# Patient Record
Sex: FEMALE | Race: WHITE | NOT HISPANIC OR LATINO | Employment: FULL TIME | URBAN - METROPOLITAN AREA
[De-identification: names, ages, dates, MRNs, and addresses within clinical notes are randomized per-mention and may not be internally consistent; named-entity substitution may affect disease eponyms.]

---

## 2017-01-10 ENCOUNTER — OFFICE VISIT (OUTPATIENT)
Dept: OBSTETRICS AND GYNECOLOGY | Facility: CLINIC | Age: 37
End: 2017-01-10

## 2017-01-10 VITALS
BODY MASS INDEX: 33.31 KG/M2 | SYSTOLIC BLOOD PRESSURE: 112 MMHG | WEIGHT: 181 LBS | DIASTOLIC BLOOD PRESSURE: 78 MMHG | HEIGHT: 62 IN

## 2017-01-10 DIAGNOSIS — Z13.9 SCREENING: ICD-10-CM

## 2017-01-10 DIAGNOSIS — Z01.419 ENCOUNTER FOR GYNECOLOGICAL EXAMINATION WITHOUT ABNORMAL FINDING: Primary | ICD-10-CM

## 2017-01-10 DIAGNOSIS — Z12.4 SCREENING FOR MALIGNANT NEOPLASM OF CERVIX: ICD-10-CM

## 2017-01-10 LAB
BILIRUB BLD-MCNC: NEGATIVE MG/DL
CLARITY, POC: CLEAR
COLOR UR: YELLOW
GLUCOSE UR STRIP-MCNC: NEGATIVE MG/DL
KETONES UR QL: NEGATIVE
LEUKOCYTE EST, POC: NEGATIVE
NITRITE UR-MCNC: NEGATIVE MG/ML
PH UR: 5.5 [PH] (ref 5–8)
PROT UR STRIP-MCNC: NEGATIVE MG/DL
RBC # UR STRIP: ABNORMAL /UL
SP GR UR: 1.02 (ref 1–1.03)
UROBILINOGEN UR QL: NORMAL

## 2017-01-10 PROCEDURE — 81002 URINALYSIS NONAUTO W/O SCOPE: CPT | Performed by: OBSTETRICS & GYNECOLOGY

## 2017-01-10 PROCEDURE — 99395 PREV VISIT EST AGE 18-39: CPT | Performed by: OBSTETRICS & GYNECOLOGY

## 2017-01-10 RX ORDER — CYANOCOBALAMIN 1000 UG/ML
INJECTION, SOLUTION INTRAMUSCULAR; SUBCUTANEOUS
Refills: 0 | COMMUNITY
Start: 2016-12-13

## 2017-01-10 RX ORDER — FLUTICASONE PROPIONATE 50 MCG
SPRAY, SUSPENSION (ML) NASAL
Refills: 2 | COMMUNITY
Start: 2016-12-23 | End: 2017-01-10

## 2017-01-10 RX ORDER — LEVONORGESTREL AND ETHINYL ESTRADIOL AND ETHINYL ESTRADIOL 150-30(84)
KIT ORAL
Refills: 0 | COMMUNITY
Start: 2016-10-23 | End: 2017-01-10 | Stop reason: SDUPTHER

## 2017-01-10 RX ORDER — LEVONORGESTREL AND ETHINYL ESTRADIOL AND ETHINYL ESTRADIOL 150-30(84)
1 KIT ORAL DAILY
Qty: 91 EACH | Refills: 4 | Status: SHIPPED | OUTPATIENT
Start: 2017-01-10

## 2017-01-10 RX ORDER — POLYMYXIN B SULFATE AND TRIMETHOPRIM 1; 10000 MG/ML; [USP'U]/ML
SOLUTION OPHTHALMIC
Refills: 0 | COMMUNITY
Start: 2016-12-23 | End: 2017-01-10

## 2017-01-10 RX ORDER — SUMATRIPTAN 100 MG/1
TABLET, FILM COATED ORAL
Refills: 0 | COMMUNITY
Start: 2016-12-13

## 2017-01-10 NOTE — PROGRESS NOTES
"[facility]    GYN ANNUAL EXAM:    Subjective   History of Present Illness    Nicolette Pollard is a 36 y.o. female who presents for annual exam.No GYN complaints. Quit smoking. Pill is helping with headaches, menstrually associated migraines. Daughter is well. Age 14.    Obstetric History:  OB History      Para Term  AB TAB SAB Ectopic Multiple Living    1 1 1       1         Menstrual History:     Patient's last menstrual period was 2016 (approximate).       Sexual History: active         Family history of breast cancer: no  Family history of ovarian cancer: no  Family history of uterine cancer: no  Family History of cervical cancer: no  Family History of colon cancer/colon polyps: no  Regular self breast exam: yes  Current contraception:OCP  History of abnormal Pap smear: yes - LGSIL  Received Gardasil immunization: N/A  GENI exposure in utero: no  History of abnormal mammogram: no    The following portions of the patient's history were reviewed and updated as appropriate: allergies, current medications, past family history, past medical history, past social history, past surgical history and problem list.    Review of Systems    Pertinent items are noted in HPI.       Objective   Physical Exam    Visit Vitals   • /78   • Ht 62\" (157.5 cm)   • Wt 181 lb (82.1 kg)   • LMP 2016 (Approximate)  Comment: does the 3 month packs. due to start soon.   • BMI 33.11 kg/m2       General:   alert, appears stated age and cooperative   Heart: regular rate and rhythm, S1, S2 normal, no murmur, click, rub or gallop   Lungs: clear to auscultation bilaterally   Abdomen: soft, non-tender, without masses or organomegaly   Breast: inspection negative, no nipple discharge or bleeding, no masses or nodularity palpable   Vulva: normal   Vagina: normal mucosa, normal discharge   Cervix: no bleeding following Pap, no cervical motion tenderness and no lesions   Uterus: " normal size, midline, anteverted, non-tender, mobile   Adnexa: normal adnexa and no mass, fullness, tenderness     Assessment/Plan   Nicolette was seen today for gynecologic exam.    Diagnoses and all orders for this visit:    Encounter for gynecological examination without abnormal finding    Screening  -     POC Urinalysis Dipstick    Screening for malignant neoplasm of cervix  -     IgP, Aptima HPV    Other orders  -     ASHLYNA 0.15-0.03 &0.01 MG tablet; Take 1 tablet by mouth Daily.        Thin prep Pap smear.  Contraception: OCP.  All questions answered.  Await pap smear results.  Follow up in 1 year.               Jane Holden MD,  FACOG

## 2017-01-10 NOTE — MR AVS SNAPSHOT
Nicolette Pollard   1/10/2017 9:15 AM   Office Visit    Dept Phone:  [phone]   Encounter #:  [id number]    Provider:  Jane Holden MD   Department:  [facility]                Your Full Care Plan              Today's Medication Changes          These changes are accurate as of: 1/10/17 10:03 AM.  If you have any questions, ask your nurse or doctor.               Stop taking medication(s)listed here:     fluticasone 50 MCG/ACT nasal spray   Commonly known as:  FLONASE   Stopped by:  Jane Holden MD           trimethoprim-polymyxin b 14278-7.1 UNIT/ML-% ophthalmic solution   Commonly known as:  POLYTRIM   Stopped by:  Jane Holden MD                      Your Updated Medication List          This list is accurate as of: 1/10/17 10:03 AM.  Always use your most recent med list.                ASHLYNA 0.15-0.03 &0.01 MG tablet   Generic drug:  Levonorgest-Eth Estrad 91-Day       cyanocobalamin 1000 MCG/ML injection       SUMAtriptan 100 MG tablet   Commonly known as:  IMITREX               We Performed the Following     IgP, Aptima HPV     POC Urinalysis Dipstick       You Were Diagnosed With        Codes Comments    Encounter for gynecological examination without abnormal finding    -  Primary ICD-10-CM: Z01.419  ICD-9-CM: V72.31     Encounter for contraceptive management, unspecified contraceptive encounter type     ICD-10-CM: Z30.9  ICD-9-CM: V25.9     Screening     ICD-10-CM: Z13.9  ICD-9-CM: V82.9     Screening for malignant neoplasm of cervix     ICD-10-CM: Z12.4  ICD-9-CM: V76.2       Instructions     None    Patient Instructions History      Upcoming Appointments     Visit Type Date Time Department    OFFICE VISIT 1/10/2017  9:15 AM GINI Fagan Signup     [facility] allows you to send messages to your doctor, view your test results, renew your prescriptions, schedule appointments, and  "more. To sign up, go to [employer] and click on the Sign Up Now link in the New User? box. Enter your [facility] Activation Code exactly as it appears below along with the last four digits of your Social Security Number and your Date of Birth () to complete the sign-up process. If you do not sign up before the expiration date, you must request a new code.    [facility] Activation Code: [email or web address]  Expires: 2017  9:59 AM    If you have questions, you can email [email or web address] or call [phone] to talk to our [facility] staff. Remember, [facility] is NOT to be used for urgent needs. For medical emergencies, dial 911.               Other Info from Your Visit           Allergies     No Known Allergies      Reason for Visit     Gynecologic Exam LAST PAP 2015       Vital Signs     Blood Pressure Height Weight Last Menstrual Period Body Mass Index Smoking Status    112/78 62\" (157.5 cm) 181 lb (82.1 kg) 2016 (Approximate) 33.11 kg/m2 Former Smoker      Problems and Diagnoses Noted     Encounter for routine gynecological examination    -  Primary    Encounter for contraceptive management        Screening        Screening for cervical cancer          Results     POC Urinalysis Dipstick      Component Value Standard Range & Units    Color Yellow Yellow, Straw, Dark Yellow, Nisha    Clarity, UA Clear Clear    Glucose, UA Negative Negative, 1000 mg/dL (3+) mg/dL    Bilirubin Negative Negative    Ketones, UA Negative Negative    Specific Gravity  1.020 1.005 - 1.030    Blood, UA Trace Negative    pH, Urine 5.5 5.0 - 8.0    Protein, POC Negative Negative mg/dL    Urobilinogen, UA Normal Normal    Leukocytes Negative Negative    Nitrite, UA Negative Negative                    "

## 2017-01-12 LAB
CYTOLOGIST CVX/VAG CYTO: NORMAL
CYTOLOGY CVX/VAG DOC THIN PREP: NORMAL
DX ICD CODE: NORMAL
HIV 1 & 2 AB SER-IMP: NORMAL
HPV I/H RISK 4 DNA CVX QL PROBE+SIG AMP: NEGATIVE
OTHER STN SPEC: NORMAL
PATH REPORT.FINAL DX SPEC: NORMAL
STAT OF ADQ CVX/VAG CYTO-IMP: NORMAL

## 2017-01-13 ENCOUNTER — TELEPHONE (OUTPATIENT)
Dept: OBSTETRICS AND GYNECOLOGY | Facility: CLINIC | Age: 37
End: 2017-01-13

## 2017-01-13 NOTE — TELEPHONE ENCOUNTER
----- Message from Jane Holden MD sent at 1/13/2017  3:16 PM EST -----  Congratulations your pap testing is normal      Thank you  Jane Holden MD

## 2019-04-29 ENCOUNTER — APPOINTMENT (OUTPATIENT)
Dept: WOMENS IMAGING | Facility: HOSPITAL | Age: 39
End: 2019-04-29

## 2019-04-29 PROCEDURE — 77063 BREAST TOMOSYNTHESIS BI: CPT | Performed by: RADIOLOGY

## 2019-04-29 PROCEDURE — 77067 SCR MAMMO BI INCL CAD: CPT | Performed by: RADIOLOGY

## 2019-05-14 ENCOUNTER — APPOINTMENT (OUTPATIENT)
Dept: WOMENS IMAGING | Facility: HOSPITAL | Age: 39
End: 2019-05-14

## 2019-05-14 PROCEDURE — G0279 TOMOSYNTHESIS, MAMMO: HCPCS | Performed by: RADIOLOGY

## 2019-05-14 PROCEDURE — 77065 DX MAMMO INCL CAD UNI: CPT | Performed by: RADIOLOGY

## 2019-05-14 PROCEDURE — 76641 ULTRASOUND BREAST COMPLETE: CPT | Performed by: RADIOLOGY

## 2019-05-14 PROCEDURE — 77061 BREAST TOMOSYNTHESIS UNI: CPT | Performed by: RADIOLOGY

## 2019-07-18 ENCOUNTER — HOSPITAL ENCOUNTER (OUTPATIENT)
Dept: GENERAL RADIOLOGY | Facility: HOSPITAL | Age: 39
Discharge: HOME | End: 2019-07-18
Admitting: FAMILY MEDICINE

## 2019-07-18 ENCOUNTER — HOSPITAL ENCOUNTER (OUTPATIENT)
Dept: GENERAL RADIOLOGY | Facility: HOSPITAL | Age: 39
Discharge: HOME | End: 2019-07-18

## 2019-07-18 DIAGNOSIS — M54.9 BACK PAIN, UNSPECIFIED BACK LOCATION, UNSPECIFIED BACK PAIN LATERALITY, UNSPECIFIED CHRONICITY: ICD-10-CM

## 2019-07-18 PROCEDURE — 72070 X-RAY EXAM THORAC SPINE 2VWS: CPT

## 2019-07-18 PROCEDURE — 72110 X-RAY EXAM L-2 SPINE 4/>VWS: CPT

## 2019-07-18 PROCEDURE — 72050 X-RAY EXAM NECK SPINE 4/5VWS: CPT

## 2019-07-29 ENCOUNTER — TRANSCRIBE ORDERS (OUTPATIENT)
Dept: ADMINISTRATIVE | Facility: HOSPITAL | Age: 39
End: 2019-07-29

## 2019-07-29 DIAGNOSIS — M54.5 LOW BACK PAIN, UNSPECIFIED BACK PAIN LATERALITY, UNSPECIFIED CHRONICITY, WITH SCIATICA PRESENCE UNSPECIFIED: Primary | ICD-10-CM

## 2019-08-19 ENCOUNTER — APPOINTMENT (OUTPATIENT)
Dept: MRI IMAGING | Facility: HOSPITAL | Age: 39
End: 2019-08-19

## 2019-09-04 DIAGNOSIS — M54.5 LOW BACK PAIN, UNSPECIFIED BACK PAIN LATERALITY, UNSPECIFIED CHRONICITY, WITH SCIATICA PRESENCE UNSPECIFIED: ICD-10-CM

## 2020-01-08 NOTE — PROGRESS NOTES
Subjective   Patient ID: Nicolette Pollard is a 39 y.o. female is being seen for consultation today at the request of Taisha Price MD for low back left hip pain.  She states  She was diagnosed with scoliosis around age 32.     Patient has seen Dr Colon in the past and has had PT ( lastly in 2016). Physical therapy gave no relief.  She has not had KG or surgery.She takes Hydrocodone 5/325 mg prn for pain.     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 She has had back pain since about 2016. At that time, she was found to have some scoliosis and underwent some physical therapy which really did not help all that much. The back pain overall has settled down and it is manageable. About a year ago, she began to have some associated left buttock and posterior thigh pain intermittently. She has occasionally some left-sided trapezius pain. Overall, she can function. She walks regularly. She has pain that is manageable. She has not missed work. We discussed the nature of her problem and her degenerative disk disease with some early stages of spinal stenosis and some of the scoliosis that she has. She is functional. Obviously, there is not much to do, but I did tell her it would be helpful if she tries to get regular exercise, which she has not done in the past, focusing on core strengthening and flexibility. Will send her to the therapist for 1-2 visits to help her set up an exercise program. She is doing well and will keep it open-ended. She can certainly call us if things flare up involving the back, the leg, or both. We can consider blocks or more therapy at that time.        Back Pain   This is a chronic problem. The current episode started more than 1 year ago. The problem occurs daily. The problem has been waxing and waning since onset. The pain is present in the lumbar spine and thoracic spine. The quality of the pain is described as aching. The pain radiates to the left thigh. The pain  is at a severity of 5/10. The pain is worse during the night. The symptoms are aggravated by bending and position. Stiffness is present in the morning. Associated symptoms include headaches, leg pain and weakness. Pertinent negatives include no abdominal pain, bladder incontinence, bowel incontinence, chest pain, dysuria, fever, numbness, paresis, paresthesias, pelvic pain, perianal numbness, tingling or weight loss. She has tried ice and heat (PT ) for the symptoms. The treatment provided no relief.   Leg Pain    The pain is present in the left hip. The quality of the pain is described as aching. The pain is at a severity of 3/10. The pain is mild. The pain has been intermittent since onset. Pertinent negatives include no numbness or tingling. She has tried heat and ice for the symptoms.       The following portions of the patient's history were reviewed and updated as appropriate: allergies, current medications, past family history, past medical history, past social history, past surgical history and problem list.    Review of Systems   Constitutional: Negative for fever and weight loss.   Cardiovascular: Negative for chest pain.   Gastrointestinal: Negative for abdominal pain and bowel incontinence.   Genitourinary: Negative for bladder incontinence, dysuria and pelvic pain.   Musculoskeletal: Positive for back pain (L hip).   Neurological: Positive for weakness and headaches. Negative for tingling, numbness and paresthesias.   All other systems reviewed and are negative.      Objective   Physical Exam   Constitutional: She is oriented to person, place, and time. She appears well-developed and well-nourished.   HENT:   Head: Normocephalic and atraumatic.   Eyes: Pupils are equal, round, and reactive to light. Conjunctivae and EOM are normal.   Fundoscopic exam:       The right eye shows no papilledema. The right eye shows venous pulsations.        The left eye shows no papilledema. The left eye shows venous  pulsations.   Neck: Carotid bruit is not present.   Neurological: She is oriented to person, place, and time. She has a normal Finger-Nose-Finger Test and a normal Heel to Shin Test. Gait normal.   Reflex Scores:       Tricep reflexes are 2+ on the right side and 2+ on the left side.       Bicep reflexes are 2+ on the right side and 2+ on the left side.       Brachioradialis reflexes are 2+ on the right side and 2+ on the left side.       Patellar reflexes are 2+ on the right side and 2+ on the left side.       Achilles reflexes are 2+ on the right side and 2+ on the left side.  Psychiatric: Her speech is normal.     Neurologic Exam     Mental Status   Oriented to person, place, and time.   Registration of memory: Good recent and remote memory.   Attention: normal. Concentration: normal.   Speech: speech is normal   Level of consciousness: alert  Knowledge: consistent with education.     Cranial Nerves     CN II   Visual fields full to confrontation.   Visual acuity: normal    CN III, IV, VI   Pupils are equal, round, and reactive to light.  Extraocular motions are normal.     CN V   Facial sensation intact.   Right corneal reflex: normal  Left corneal reflex: normal    CN VII   Facial expression full, symmetric.   Right facial weakness: none  Left facial weakness: none    CN VIII   Hearing: intact    CN IX, X   Palate: symmetric    CN XI   Right sternocleidomastoid strength: normal  Left sternocleidomastoid strength: normal    CN XII   Tongue: not atrophic  Tongue deviation: none    Motor Exam   Muscle bulk: normal  Right arm tone: normal  Left arm tone: normal  Right leg tone: normal  Left leg tone: normal    Strength   Strength 5/5 except as noted.     Sensory Exam   Light touch normal.     Gait, Coordination, and Reflexes     Gait  Gait: normal    Coordination   Finger to nose coordination: normal  Heel to shin coordination: normal    Reflexes   Right brachioradialis: 2+  Left brachioradialis: 2+  Right biceps:  2+  Left biceps: 2+  Right triceps: 2+  Left triceps: 2+  Right patellar: 2+  Left patellar: 2+  Right achilles: 2+  Left achilles: 2+  Right : 2+  Left : 2+      Assessment/Plan   Independent Review of Radiographic Studies:    She had a lumbar MRI done in August 2019 as well as plain x-rays that did show some degenerative scoliosis as well as some spinal stenosis at L3-L4 with some lateral recess stenosis involving both sides and severe disc space collapse as well as a mild left L4-L5 disc bulge.  Agree with the report.      Medical Decision Making:    We will set her up to see the physical therapist for 1-2 visits to set up a home exercise program focusing on core strengthening and flexibility.  Since she is doing well at this point, we can keep it open-ended.  If there is a severe flareup of either the back or the leg or both she will let us know and she can be reevaluated.      Nicolette was seen today for back pain.    Diagnoses and all orders for this visit:    DDD (degenerative disc disease), lumbar  -     Ambulatory Referral to Physical Therapy Evaluate and treat    Spinal stenosis of lumbar region with neurogenic claudication  -     Ambulatory Referral to Physical Therapy Evaluate and treat      Return if symptoms worsen or fail to improve.

## 2020-01-13 ENCOUNTER — OFFICE VISIT (OUTPATIENT)
Dept: NEUROSURGERY | Facility: CLINIC | Age: 40
End: 2020-01-13

## 2020-01-13 VITALS
BODY MASS INDEX: 30.66 KG/M2 | SYSTOLIC BLOOD PRESSURE: 152 MMHG | WEIGHT: 166.6 LBS | DIASTOLIC BLOOD PRESSURE: 99 MMHG | HEIGHT: 62 IN | HEART RATE: 91 BPM

## 2020-01-13 DIAGNOSIS — M48.062 SPINAL STENOSIS OF LUMBAR REGION WITH NEUROGENIC CLAUDICATION: ICD-10-CM

## 2020-01-13 DIAGNOSIS — M51.36 DDD (DEGENERATIVE DISC DISEASE), LUMBAR: Primary | ICD-10-CM

## 2020-01-13 PROCEDURE — 99243 OFF/OP CNSLTJ NEW/EST LOW 30: CPT | Performed by: NEUROLOGICAL SURGERY

## 2020-01-13 RX ORDER — HYDROCODONE BITARTRATE AND ACETAMINOPHEN 5; 325 MG/1; MG/1
TABLET ORAL
COMMUNITY
Start: 2019-10-29

## 2020-01-13 RX ORDER — DEXTROAMPHETAMINE SACCHARATE, AMPHETAMINE ASPARTATE, DEXTROAMPHETAMINE SULFATE AND AMPHETAMINE SULFATE 2.5; 2.5; 2.5; 2.5 MG/1; MG/1; MG/1; MG/1
TABLET ORAL
COMMUNITY
Start: 2019-10-22

## 2021-10-18 ENCOUNTER — APPOINTMENT (OUTPATIENT)
Dept: WOMENS IMAGING | Facility: HOSPITAL | Age: 41
End: 2021-10-18

## 2021-10-18 PROCEDURE — 77067 SCR MAMMO BI INCL CAD: CPT | Performed by: RADIOLOGY

## 2021-10-18 PROCEDURE — 77063 BREAST TOMOSYNTHESIS BI: CPT | Performed by: RADIOLOGY
